# Patient Record
Sex: MALE | Race: WHITE | NOT HISPANIC OR LATINO | URBAN - NONMETROPOLITAN AREA
[De-identification: names, ages, dates, MRNs, and addresses within clinical notes are randomized per-mention and may not be internally consistent; named-entity substitution may affect disease eponyms.]

---
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
## 2022-03-04 ASSESSMENT — TONOMETRY
OD_IOP_MMHG: 16
OS_IOP_MMHG: 16

## 2022-04-09 ASSESSMENT — VISUAL ACUITY
OD_SC: 20/20
OS_PH: 20/20-2
OD_CC: 20/200
OD_CC: J1
OS_CC: 20/70
OS_CC: J1
OD_SC: J1
OS_SC: 20/20
OD_PH: 20/25-1
OS_SC: J1

## 2022-04-09 ASSESSMENT — TONOMETRY
OD_IOP_MMHG: 14
OS_IOP_MMHG: 16
OD_IOP_MMHG: 16
OS_IOP_MMHG: 14

## 2022-12-01 ENCOUNTER — ESTABLISHED PATIENT (OUTPATIENT)
Dept: RURAL CLINIC 1 | Facility: CLINIC | Age: 22
End: 2022-12-01

## 2022-12-01 PROCEDURE — 92310-E CONTACT LENS FITTING ESTABLISH PATIENT

## 2022-12-01 PROCEDURE — 92015 DETERMINE REFRACTIVE STATE: CPT

## 2022-12-01 PROCEDURE — 92014 COMPRE OPH EXAM EST PT 1/>: CPT

## 2022-12-01 ASSESSMENT — VISUAL ACUITY
OU_CC: 20/30
OS_CC: 20/20
OU_CC: 20/20
OD_CC: 20/20

## 2022-12-01 ASSESSMENT — TONOMETRY
OS_IOP_MMHG: 15
OD_IOP_MMHG: 15

## 2022-12-01 NOTE — PATIENT DISCUSSION
"acute follicular conj ODEDUCATE PTSTART MAXITROL 1 GTT QID OD X 1WKRTC PRN; Dr's Notes: Prefers ""Tom."" 10th grade. Wears size 15 shoes! Mother Governor Gilbert. Plays [employer]. "